# Patient Record
Sex: MALE | Race: WHITE | NOT HISPANIC OR LATINO | URBAN - METROPOLITAN AREA
[De-identification: names, ages, dates, MRNs, and addresses within clinical notes are randomized per-mention and may not be internally consistent; named-entity substitution may affect disease eponyms.]

---

## 2017-02-10 ENCOUNTER — OUTPATIENT (OUTPATIENT)
Dept: OUTPATIENT SERVICES | Facility: HOSPITAL | Age: 62
LOS: 1 days | End: 2017-02-10
Payer: COMMERCIAL

## 2017-02-10 DIAGNOSIS — R07.9 CHEST PAIN, UNSPECIFIED: ICD-10-CM

## 2017-02-10 PROCEDURE — 93018 CV STRESS TEST I&R ONLY: CPT

## 2017-02-10 PROCEDURE — 93350 STRESS TTE ONLY: CPT | Mod: 26

## 2017-02-10 PROCEDURE — 93351 STRESS TTE COMPLETE: CPT

## 2017-02-10 PROCEDURE — 93320 DOPPLER ECHO COMPLETE: CPT | Mod: 26

## 2017-02-10 PROCEDURE — 93325 DOPPLER ECHO COLOR FLOW MAPG: CPT | Mod: 26

## 2017-02-10 PROCEDURE — 93016 CV STRESS TEST SUPVJ ONLY: CPT

## 2020-01-03 ENCOUNTER — RECORD ABSTRACTING (OUTPATIENT)
Age: 65
End: 2020-01-03

## 2020-01-03 LAB
PSA FREE FLD-MCNC: 21.2
PSA FREE SERPL-MCNC: 1.17
PSA SERPL-MCNC: 5.53
TESTOST BND SERPL-MCNC: 376.7

## 2020-03-02 ENCOUNTER — APPOINTMENT (OUTPATIENT)
Dept: UROLOGY | Facility: CLINIC | Age: 65
End: 2020-03-02
Payer: MEDICARE

## 2020-03-02 VITALS
HEART RATE: 65 BPM | HEIGHT: 72 IN | BODY MASS INDEX: 29.12 KG/M2 | WEIGHT: 215 LBS | SYSTOLIC BLOOD PRESSURE: 129 MMHG | DIASTOLIC BLOOD PRESSURE: 83 MMHG | TEMPERATURE: 98.9 F

## 2020-03-02 DIAGNOSIS — Z87.891 PERSONAL HISTORY OF NICOTINE DEPENDENCE: ICD-10-CM

## 2020-03-02 DIAGNOSIS — M87.059 IDIOPATHIC ASEPTIC NECROSIS OF UNSPECIFIED FEMUR: ICD-10-CM

## 2020-03-02 DIAGNOSIS — Z78.9 OTHER SPECIFIED HEALTH STATUS: ICD-10-CM

## 2020-03-02 DIAGNOSIS — I10 ESSENTIAL (PRIMARY) HYPERTENSION: ICD-10-CM

## 2020-03-02 DIAGNOSIS — I25.10 ATHEROSCLEROTIC HEART DISEASE OF NATIVE CORONARY ARTERY W/OUT ANGINA PECTORIS: ICD-10-CM

## 2020-03-02 DIAGNOSIS — Z00.00 ENCOUNTER FOR GENERAL ADULT MEDICAL EXAMINATION W/OUT ABNORMAL FINDINGS: ICD-10-CM

## 2020-03-02 DIAGNOSIS — E78.5 HYPERLIPIDEMIA, UNSPECIFIED: ICD-10-CM

## 2020-03-02 DIAGNOSIS — Z80.1 FAMILY HISTORY OF MALIGNANT NEOPLASM OF TRACHEA, BRONCHUS AND LUNG: ICD-10-CM

## 2020-03-02 DIAGNOSIS — I25.2 OLD MYOCARDIAL INFARCTION: ICD-10-CM

## 2020-03-02 LAB
BILIRUB UR QL STRIP: NORMAL
CLARITY UR: CLEAR
COLLECTION METHOD: NORMAL
GLUCOSE UR-MCNC: NORMAL
HCG UR QL: 0.2 EU/DL
HGB UR QL STRIP.AUTO: NORMAL
KETONES UR-MCNC: NORMAL
LEUKOCYTE ESTERASE UR QL STRIP: NORMAL
NITRITE UR QL STRIP: NORMAL
PH UR STRIP: 7
PROT UR STRIP-MCNC: NORMAL
SP GR UR STRIP: 1.02

## 2020-03-02 PROCEDURE — 81003 URINALYSIS AUTO W/O SCOPE: CPT | Mod: QW

## 2020-03-02 PROCEDURE — 99204 OFFICE O/P NEW MOD 45 MIN: CPT

## 2020-03-02 PROCEDURE — 76857 US EXAM PELVIC LIMITED: CPT

## 2020-03-02 RX ORDER — ROSUVASTATIN CALCIUM 5 MG/1
TABLET, FILM COATED ORAL
Refills: 0 | Status: ACTIVE | COMMUNITY

## 2020-03-02 RX ORDER — LOSARTAN POTASSIUM 100 MG/1
TABLET, FILM COATED ORAL
Refills: 0 | Status: ACTIVE | COMMUNITY

## 2020-03-02 RX ORDER — SILDENAFIL 100 MG/1
100 TABLET, FILM COATED ORAL
Qty: 10 | Refills: 12 | Status: ACTIVE | COMMUNITY
Start: 2020-03-02 | End: 1900-01-01

## 2020-03-02 RX ORDER — PRASUGREL HYDROCHLORIDE 10 MG/1
TABLET, COATED ORAL
Refills: 0 | Status: ACTIVE | COMMUNITY

## 2020-03-02 RX ORDER — LOSARTAN POTASSIUM 100 MG/1
100 TABLET, FILM COATED ORAL
Refills: 0 | Status: ACTIVE | COMMUNITY

## 2020-03-02 RX ORDER — METOPROLOL TARTRATE 75 MG/1
TABLET, FILM COATED ORAL
Refills: 0 | Status: ACTIVE | COMMUNITY

## 2020-03-02 RX ORDER — ASPIRIN 81 MG
81 TABLET,CHEWABLE ORAL
Refills: 0 | Status: ACTIVE | COMMUNITY

## 2020-03-02 NOTE — PHYSICAL EXAM
[General Appearance - Well Developed] : well developed [General Appearance - Well Nourished] : well nourished [Normal Appearance] : normal appearance [Well Groomed] : well groomed [General Appearance - In No Acute Distress] : no acute distress [Heart Rate And Rhythm] : Heart rate and rhythm were normal [Edema] : no peripheral edema [Respiration, Rhythm And Depth] : normal respiratory rhythm and effort [Exaggerated Use Of Accessory Muscles For Inspiration] : no accessory muscle use [Abdomen Soft] : soft [Abdomen Tenderness] : non-tender [Abdomen Mass (___ Cm)] : no abdominal mass palpated [Costovertebral Angle Tenderness] : no ~M costovertebral angle tenderness [Urethral Meatus] : meatus normal [Urinary Bladder Findings] : the bladder was normal on palpation [Scrotum] : the scrotum was normal [Epididymis] : the epididymides were normal [Testes Tenderness] : no tenderness of the testes [No Prostate Nodules] : no prostate nodules [Testes Mass (___cm)] : there were no testicular masses [Prostate Tenderness] : the prostate was not tender [Normal Station and Gait] : the gait and station were normal for the patient's age [Skin Color & Pigmentation] : normal skin color and pigmentation [No Focal Deficits] : no focal deficits [] : no rash [Oriented To Time, Place, And Person] : oriented to person, place, and time [Affect] : the affect was normal [Mood] : the mood was normal [Not Anxious] : not anxious [No Palpable Adenopathy] : no palpable adenopathy [FreeTextEntry1] : Small, reducible umbilical hernia

## 2020-03-02 NOTE — HISTORY OF PRESENT ILLNESS
[FreeTextEntry1] : Mr. MIRELLA GUEVARA comes in today for a urologic evaluation.  He presents with minimal LUTS (obstructive and irritative) and nocturia x 2. \par IPSS: 10/35\par Sono:  12cc PVR; 86cc prostate  \par \par PSAs: 12/2/19--5.1 (25%); 2/6/19--5.53 (21%); 3/5/18--5.43 (23%); 11/21/16--4.94 (21%); 11/14--6.1; \par \par MRI prostate:  6/7/17--No suspicious areas. 61cc prostate.  5mm anterior bladder nodule--? lymph node. Bilat femoral head signal alteration suspicious for AVN. \par

## 2020-03-02 NOTE — LETTER BODY
[Consult Letter:] : I had the pleasure of evaluating your patient, [unfilled]. [Dear  ___] : Dear  [unfilled], [Please see my note below.] : Please see my note below. [Sincerely,] : Sincerely, [Consult Closing:] : Thank you very much for allowing me to participate in the care of this patient.  If you have any questions, please do not hesitate to contact me. [FreeTextEntry3] : Mayank Phillip MD, FACS\par

## 2020-03-02 NOTE — ASSESSMENT
[FreeTextEntry1] : I discussed the findings and options with Mr. MIRELLA GUEVARA in detail.  He will consider discontinuing the alfuzosin and restarting it if the voiding symptoms worsen.  Regarding the ED, Mr. Guevara will continue on the sildenafil and he will followup with us in one year (sono, PSA).\par

## 2020-03-02 NOTE — REVIEW OF SYSTEMS
[see HPI] : see HPI [Arthralgias] : arthralgias [Joint Pain] : joint pain [Easy Bleeding] : a tendency for easy bleeding [Easy Bruising] : a tendency for easy bruising [Negative] : Endocrine

## 2021-03-01 ENCOUNTER — APPOINTMENT (OUTPATIENT)
Dept: UROLOGY | Facility: CLINIC | Age: 66
End: 2021-03-01
Payer: MEDICARE

## 2021-03-01 VITALS — HEIGHT: 70 IN | BODY MASS INDEX: 24.2 KG/M2 | WEIGHT: 169 LBS

## 2021-03-01 DIAGNOSIS — Z80.42 FAMILY HISTORY OF MALIGNANT NEOPLASM OF PROSTATE: ICD-10-CM

## 2021-03-01 LAB
BILIRUB UR QL STRIP: NORMAL
CLARITY UR: CLEAR
COLLECTION METHOD: NORMAL
GLUCOSE UR-MCNC: NORMAL
HCG UR QL: 0.2 EU/DL
HGB UR QL STRIP.AUTO: NORMAL
KETONES UR-MCNC: NORMAL
LEUKOCYTE ESTERASE UR QL STRIP: NORMAL
NITRITE UR QL STRIP: NORMAL
PH UR STRIP: 6.5
PROT UR STRIP-MCNC: NORMAL
SP GR UR STRIP: 1.02

## 2021-03-01 PROCEDURE — 99215 OFFICE O/P EST HI 40 MIN: CPT

## 2021-03-01 PROCEDURE — 76857 US EXAM PELVIC LIMITED: CPT

## 2021-03-01 NOTE — HISTORY OF PRESENT ILLNESS
[FreeTextEntry1] : Mr. MIRELLA GUEVARA comes in today for his urologic follow-up.  He reports minimal lower urinary tract symptoms (obstructive and irritative) and nocturia x 2. He restarted the alfuzosin as he feels this is effective.\par IPSS: 11/35\par Sono:  115cc PVR; 105cc prostate\par \par Mr. Guevara has ED managed with sildenafil 100mg, with a variable response\par \par PSAs: 2/16/21--5.3; 12/2/19--5.1 (25%); 2/6/19--5.53 (21%); 1/3/19--5.53 (21%); 3/5/18--5.43 (23%); 11/21/16--4.94 (21%); 11/14--6.1; \par \par MRI prostate:  6/7/17--No suspicious areas. 61cc prostate.  5mm anterior bladder nodule--? lymph node. Bilat femoral head signal alteration suspicious for AVN*. \par \par * This has been followed up since and has remained stable.  No intervention is warranted.

## 2021-03-01 NOTE — ADDENDUM
[FreeTextEntry1] : A portion of this note was written by [Sandro Armas] on 02/23/2021 acting as a scribe for Dr. Phillip. \par \par I have personally reviewed the chart and agree that the record accurately reflects my personal performance of the history, physical exam, assessment, and plan.

## 2021-03-01 NOTE — LETTER BODY
[Dear  ___] : Dear  [unfilled], [Consult Letter:] : I had the pleasure of evaluating your patient, [unfilled]. [Please see my note below.] : Please see my note below. [Consult Closing:] : Thank you very much for allowing me to participate in the care of this patient.  If you have any questions, please do not hesitate to contact me. [Sincerely,] : Sincerely, [FreeTextEntry3] : Mayank Phillip MD, FACS\par

## 2021-03-01 NOTE — ASSESSMENT
[FreeTextEntry1] : I discussed the findings and options with Mr. MIRELLA GUEVARA in detail.  I reviewed behavioral modification with him.  He will continue with the alfuzosin as it seems to be effective.\par \par Regarding the erectile dysfunction, I reviewed how sildenafil should be taken and provided him with the appropriate prescription (100 mg, #20, R 12).\par \par I reviewed the June 2017 MRI with Mr. Guevara and advised that he consider a repeat at [facility].  Once he has this done I will call him with the result.\par \par Mr. Guevara is essentially retired and will be relocating to [place] with regular visits to [place].\par \par Providing there are no new problems, I look forward to seeing him in 1 year (bladder sono, PSA).  \par

## 2021-03-01 NOTE — PHYSICAL EXAM
[General Appearance - Well Developed] : well developed [General Appearance - Well Nourished] : well nourished [Normal Appearance] : normal appearance [Well Groomed] : well groomed [General Appearance - In No Acute Distress] : no acute distress [Abdomen Soft] : soft [Abdomen Tenderness] : non-tender [Abdomen Mass (___ Cm)] : no abdominal mass palpated [Costovertebral Angle Tenderness] : no ~M costovertebral angle tenderness [Urethral Meatus] : meatus normal [Urinary Bladder Findings] : the bladder was normal on palpation [Scrotum] : the scrotum was normal [Epididymis] : the epididymides were normal [Testes Tenderness] : no tenderness of the testes [Testes Mass (___cm)] : there were no testicular masses [Prostate Tenderness] : the prostate was not tender [No Prostate Nodules] : no prostate nodules [Skin Color & Pigmentation] : normal skin color and pigmentation [Heart Rate And Rhythm] : Heart rate and rhythm were normal [Edema] : no peripheral edema [] : no respiratory distress [Respiration, Rhythm And Depth] : normal respiratory rhythm and effort [Exaggerated Use Of Accessory Muscles For Inspiration] : no accessory muscle use [Oriented To Time, Place, And Person] : oriented to person, place, and time [Affect] : the affect was normal [Mood] : the mood was normal [Not Anxious] : not anxious [Normal Station and Gait] : the gait and station were normal for the patient's age [No Focal Deficits] : no focal deficits [No Palpable Adenopathy] : no palpable adenopathy [FreeTextEntry1] : Small, reducible umbilical hernia

## 2021-03-15 ENCOUNTER — NON-APPOINTMENT (OUTPATIENT)
Age: 66
End: 2021-03-15

## 2021-08-16 ENCOUNTER — TRANSCRIPTION ENCOUNTER (OUTPATIENT)
Age: 66
End: 2021-08-16

## 2022-07-11 ENCOUNTER — APPOINTMENT (OUTPATIENT)
Dept: UROLOGY | Facility: CLINIC | Age: 67
End: 2022-07-11

## 2022-07-11 VITALS
WEIGHT: 215 LBS | HEART RATE: 68 BPM | RESPIRATION RATE: 18 BRPM | DIASTOLIC BLOOD PRESSURE: 106 MMHG | TEMPERATURE: 98.2 F | HEIGHT: 70 IN | BODY MASS INDEX: 30.78 KG/M2 | SYSTOLIC BLOOD PRESSURE: 169 MMHG

## 2022-07-11 LAB
BILIRUB UR QL STRIP: NORMAL
CLARITY UR: CLEAR
COLLECTION METHOD: NORMAL
GLUCOSE UR-MCNC: NORMAL
HCG UR QL: 0.2 EU/DL
HGB UR QL STRIP.AUTO: NORMAL
KETONES UR-MCNC: NORMAL
LEUKOCYTE ESTERASE UR QL STRIP: NORMAL
NITRITE UR QL STRIP: NORMAL
PH UR STRIP: 7
PROT UR STRIP-MCNC: NORMAL
SP GR UR STRIP: 1.01

## 2022-07-11 PROCEDURE — 76857 US EXAM PELVIC LIMITED: CPT

## 2022-07-11 PROCEDURE — 81003 URINALYSIS AUTO W/O SCOPE: CPT | Mod: QW

## 2022-07-11 PROCEDURE — 99215 OFFICE O/P EST HI 40 MIN: CPT

## 2022-07-11 RX ORDER — METOPROLOL SUCCINATE 25 MG/1
25 TABLET, EXTENDED RELEASE ORAL
Qty: 90 | Refills: 0 | Status: ACTIVE | COMMUNITY
Start: 2022-01-17

## 2022-07-11 RX ORDER — CHLORTHALIDONE 25 MG/1
25 TABLET ORAL
Qty: 90 | Refills: 0 | Status: ACTIVE | COMMUNITY
Start: 2022-06-09

## 2022-07-11 RX ORDER — EVOLOCUMAB 140 MG/ML
140 INJECTION, SOLUTION SUBCUTANEOUS
Qty: 6 | Refills: 0 | Status: ACTIVE | COMMUNITY
Start: 2022-01-04

## 2022-07-11 RX ORDER — ALIROCUMAB 75 MG/ML
75 INJECTION, SOLUTION SUBCUTANEOUS
Qty: 2 | Refills: 0 | Status: ACTIVE | COMMUNITY
Start: 2022-01-17

## 2022-07-11 NOTE — LETTER BODY
[Dear  ___] : Dear  [unfilled], [Consult Letter:] : I had the pleasure of evaluating your patient, [unfilled]. [Please see my note below.] : Please see my note below. [Consult Closing:] : Thank you very much for allowing me to participate in the care of this patient.  If you have any questions, please do not hesitate to contact me. [Sincerely,] : Sincerely, [DrJesse  ___] : Dr. SANCHEZ [FreeTextEntry3] : Mayank Phillip MD, FACS\par

## 2022-07-11 NOTE — HISTORY OF PRESENT ILLNESS
[FreeTextEntry1] : Mr. MIRELLA GUEVARA comes in today for his annual urologic follow-up.  He reports moderate stable lower urinary tract symptoms (obstructive and irritative) and nocturia x 2. He is continuing on alfuzosin, which he feels is beneficial. \par IPSS: 11/35\par Sono (performed to assess bladder emptying): 58cc PVR, 121cc prostate\par \par Mr. Guevara has erectile dysfunction for which he takes sildenafil 100-150mg, with a satisfactory response. \par \par PSAs: 2/16/22--6.5 (23%); 2/16/21--5.3*; 2/3/21--7.5 (23); 12/2/19--5.1 (25%); 2/6/19--5.53 (21%); 1/3/19--5.53 (21%); 3/5/18--5.43 (23%); 11/21/16--4.94 (21%); 11/14--6.1; \par *PSAD: 0.72\par \par MRI prostate:  3/8/21--Subcentimeter right perivesical nodule remains indeterminate but is stable in size and of doubtful clinical significance. Prostate 73cc; 6/7/17--No suspicious areas. 61cc prostate.  5mm anterior bladder nodule--? lymph node. Bilat femoral head signal alteration suspicious for AVN*. \par \par * This has been followed up since and has remained stable.  No intervention is warranted.

## 2022-07-11 NOTE — PHYSICAL EXAM
[General Appearance - Well Developed] : well developed [General Appearance - Well Nourished] : well nourished [Normal Appearance] : normal appearance [Well Groomed] : well groomed [General Appearance - In No Acute Distress] : no acute distress [Abdomen Soft] : soft [Abdomen Tenderness] : non-tender [Abdomen Mass (___ Cm)] : no abdominal mass palpated [Costovertebral Angle Tenderness] : no ~M costovertebral angle tenderness [Urethral Meatus] : meatus normal [Urinary Bladder Findings] : the bladder was normal on palpation [Scrotum] : the scrotum was normal [Epididymis] : the epididymides were normal [Testes Tenderness] : no tenderness of the testes [Testes Mass (___cm)] : there were no testicular masses [Prostate Tenderness] : the prostate was not tender [No Prostate Nodules] : no prostate nodules [Skin Color & Pigmentation] : normal skin color and pigmentation [Heart Rate And Rhythm] : Heart rate and rhythm were normal [Edema] : no peripheral edema [] : no respiratory distress [Exaggerated Use Of Accessory Muscles For Inspiration] : no accessory muscle use [Respiration, Rhythm And Depth] : normal respiratory rhythm and effort [Oriented To Time, Place, And Person] : oriented to person, place, and time [Affect] : the affect was normal [Mood] : the mood was normal [Not Anxious] : not anxious [Normal Station and Gait] : the gait and station were normal for the patient's age [No Focal Deficits] : no focal deficits [No Palpable Adenopathy] : no palpable adenopathy [Penis Abnormality] : normal circumcised penis [FreeTextEntry1] : Small, reducible umbilical hernia

## 2022-07-11 NOTE — ASSESSMENT
[FreeTextEntry1] : I discussed the findings and options with Mr. MIRELLA GUEVARA in detail.  He is satisfied with his voiding pattern and will simply continue on the alfuzosin.\par \par Mr. Guevara will use either 100 or 150 mg of Viagra as needed for the ED, and I reviewed its mode of use.\par \par Providing the PSA remains stable and there are no new problems, I would like to see him in 1 year (bladder sono, PSA).

## 2022-07-11 NOTE — ADDENDUM
[FreeTextEntry1] : A portion of this note was written by [Sandro Armas] on 07/11/2022 acting as a scribe for Dr. Phillip. \par \par I have personally reviewed the chart and agree that the record accurately reflects my personal performance of the history, physical exam, assessment, and plan.

## 2022-07-12 LAB — PSA SERPL-MCNC: 9.58 NG/ML

## 2022-07-13 ENCOUNTER — TRANSCRIPTION ENCOUNTER (OUTPATIENT)
Age: 67
End: 2022-07-13

## 2022-07-14 ENCOUNTER — NON-APPOINTMENT (OUTPATIENT)
Age: 67
End: 2022-07-14

## 2022-10-25 ENCOUNTER — NON-APPOINTMENT (OUTPATIENT)
Age: 67
End: 2022-10-25

## 2023-02-06 ENCOUNTER — APPOINTMENT (OUTPATIENT)
Dept: UROLOGY | Facility: CLINIC | Age: 68
End: 2023-02-06
Payer: MEDICARE

## 2023-02-06 VITALS
OXYGEN SATURATION: 97 % | HEART RATE: 58 BPM | DIASTOLIC BLOOD PRESSURE: 89 MMHG | SYSTOLIC BLOOD PRESSURE: 162 MMHG | TEMPERATURE: 97.9 F

## 2023-02-06 PROCEDURE — 99214 OFFICE O/P EST MOD 30 MIN: CPT

## 2023-02-06 PROCEDURE — 51798 US URINE CAPACITY MEASURE: CPT

## 2023-02-06 RX ORDER — ALFUZOSIN HYDROCHLORIDE 10 MG/1
10 TABLET, EXTENDED RELEASE ORAL DAILY
Qty: 90 | Refills: 3 | Status: ACTIVE | COMMUNITY
Start: 2020-03-02 | End: 1900-01-01

## 2023-02-06 NOTE — PHYSICAL EXAM
[General Appearance - Well Developed] : well developed [General Appearance - Well Nourished] : well nourished [Normal Appearance] : normal appearance [Well Groomed] : well groomed [General Appearance - In No Acute Distress] : no acute distress [Abdomen Soft] : soft [Abdomen Tenderness] : non-tender [Abdomen Mass (___ Cm)] : no abdominal mass palpated [Costovertebral Angle Tenderness] : no ~M costovertebral angle tenderness [Urethral Meatus] : meatus normal [Penis Abnormality] : normal circumcised penis [Urinary Bladder Findings] : the bladder was normal on palpation [Scrotum] : the scrotum was normal [Epididymis] : the epididymides were normal [Testes Tenderness] : no tenderness of the testes [Testes Mass (___cm)] : there were no testicular masses [Prostate Tenderness] : the prostate was not tender [No Prostate Nodules] : no prostate nodules [Skin Color & Pigmentation] : normal skin color and pigmentation [Heart Rate And Rhythm] : Heart rate and rhythm were normal [Edema] : no peripheral edema [] : no respiratory distress [Respiration, Rhythm And Depth] : normal respiratory rhythm and effort [Exaggerated Use Of Accessory Muscles For Inspiration] : no accessory muscle use [Oriented To Time, Place, And Person] : oriented to person, place, and time [Affect] : the affect was normal [Mood] : the mood was normal [Not Anxious] : not anxious [Normal Station and Gait] : the gait and station were normal for the patient's age [No Focal Deficits] : no focal deficits [No Palpable Adenopathy] : no palpable adenopathy [FreeTextEntry1] : Small, reducible umbilical hernia

## 2023-02-06 NOTE — ASSESSMENT
[FreeTextEntry1] : I discussed the findings and options with Mr. MIRELLA GUEVARA in detail. He is doing well urologically and will simply continue on the alfuzosin for his stable urinary symptoms. \par \par Mr. Guevara will continue to use 100 mg of Viagra as needed for the ED, and try to decrease his dose to 50 mg.\par \par His recent PSAs have remained stable and should be repeated annually.\par \par Providing that there are no new problems, I look forward to seeing Mr. Guevara in one year (bladder sono, PSA).

## 2023-02-06 NOTE — ADDENDUM
[FreeTextEntry1] : A portion of this note was written by [Sandro Armas] on 02/03/2023 acting as a scribe for Dr. Phillip. \par \par I have personally reviewed the chart and agree that the record accurately reflects my personal performance of the history, physical exam, assessment, and plan.

## 2023-02-06 NOTE — HISTORY OF PRESENT ILLNESS
[FreeTextEntry1] : Mr. MIRELLA GUEVARA comes in today for his annual urologic follow-up. \par \par He reports moderate stable lower urinary tract symptoms (obstructive and irritative) and nocturia x 2. He is continuing on alfuzosin and is satisfied with this approach.  He drinks 2 caffeinated beverages daily and a shot of Vodka.\par IPSS: 8/35\par Sono (performed to assess bladder emptying): PVR 69cc\par \par Mr. Guevara has erectile dysfunction and continues to take sildenafil 100mg, with a satisfactory response. \par \par PSAs: 1/26/23--6.61; 8/15/22--7.6; 7/12/22--9.58; 2/16/22--6.5 (23%); 2/16/21--5.3*; 2/3/21--7.5 (23); 12/2/19--5.1 (25%); 2/6/19--5.53 (21%); 1/3/19--5.53 (21%); 3/5/18--5.43 (23%); 11/21/16--4.94 (21%); 11/14--6.1; \par *PSAD: 0.07\par \par MRI prostate:  3/8/21--Subcentimeter right perivesical nodule remains indeterminate but is stable in size and of doubtful clinical significance. Prostate 73cc; 6/7/17--No suspicious areas. 61cc prostate.  5mm anterior bladder nodule--? lymph node. Bilat femoral head signal alteration suspicious for AVN*. \par \par * This has been followed up since and has remained stable.  No intervention is warranted.

## 2023-10-24 ENCOUNTER — RX RENEWAL (OUTPATIENT)
Age: 68
End: 2023-10-24

## 2024-01-19 PROBLEM — N52.01 ERECTILE DYSFUNCTION DUE TO ARTERIAL INSUFFICIENCY: Status: ACTIVE | Noted: 2020-03-02

## 2024-01-19 PROBLEM — R39.9 LOWER URINARY TRACT SYMPTOMS: Status: ACTIVE | Noted: 2020-02-28

## 2024-01-19 PROBLEM — Z87.438 HISTORY OF BPH: Status: ACTIVE | Noted: 2020-03-02

## 2024-01-19 PROBLEM — R97.20 ELEVATED PSA: Status: ACTIVE | Noted: 2020-02-28

## 2024-01-19 PROBLEM — Z80.52 FAMILY HISTORY OF MALIGNANT NEOPLASM OF URINARY BLADDER: Status: ACTIVE | Noted: 2020-03-02

## 2024-01-19 PROBLEM — K42.9 UMBILICAL HERNIA: Status: ACTIVE | Noted: 2020-03-02

## 2024-01-22 ENCOUNTER — APPOINTMENT (OUTPATIENT)
Dept: UROLOGY | Facility: CLINIC | Age: 69
End: 2024-01-22
Payer: MEDICARE

## 2024-01-22 DIAGNOSIS — R97.20 ELEVATED PROSTATE, SPECIFIC ANTIGEN [PSA]: ICD-10-CM

## 2024-01-22 DIAGNOSIS — Z87.438 PERSONAL HISTORY OF OTHER DISEASES OF MALE GENITAL ORGANS: ICD-10-CM

## 2024-01-22 DIAGNOSIS — R39.9 UNSPECIFIED SYMPTOMS AND SIGNS INVOLVING THE GENITOURINARY SYSTEM: ICD-10-CM

## 2024-01-22 DIAGNOSIS — Z80.52 FAMILY HISTORY OF MALIGNANT NEOPLASM OF BLADDER: ICD-10-CM

## 2024-01-22 DIAGNOSIS — K42.9 UMBILICAL HERNIA W/OUT OBSTRUCTION OR GANGRENE: ICD-10-CM

## 2024-01-22 DIAGNOSIS — N52.01 ERECTILE DYSFUNCTION DUE TO ARTERIAL INSUFFICIENCY: ICD-10-CM

## 2024-01-22 PROCEDURE — 99214 OFFICE O/P EST MOD 30 MIN: CPT

## 2024-01-22 PROCEDURE — 51798 US URINE CAPACITY MEASURE: CPT

## 2024-01-22 RX ORDER — ALFUZOSIN HYDROCHLORIDE 10 MG/1
10 TABLET, EXTENDED RELEASE ORAL
Qty: 90 | Refills: 3 | Status: ACTIVE | COMMUNITY
Start: 2022-03-15 | End: 1900-01-01

## 2024-01-22 NOTE — HISTORY OF PRESENT ILLNESS
[FreeTextEntry1] : Mr. MIRELLA GUEVARA returns for his annual urologic follow-up. He reports moderate stable lower urinary tract symptoms (obstructive and irritative) and nocturia x 2. He is continuing on alfuzosin and is satisfied with this approach.  He drinks 2 caffeinated beverages daily and a shot of Vodka. IPSS: 10/3 Sono (performed to assess bladder emptying): 96cc PVR  Mr. Guevara has erectile dysfunction and continues to take sildenafil 100mg, with a somewhat variable  response.  PSAs: 11/15/23--6.65; 1/26/23--6.61; 8/15/22--7.6; 7/12/22--9.58; 2/16/22--6.5 (23%); 2/16/21--5.3*; 2/3/21--7.5 (23); 12/2/19--5.1 (25%); 2/6/19--5.53 (21%); 1/3/19--5.53 (21%); 3/5/18--5.43 (23%); 11/21/16--4.94 (21%); 11/14--6.1;  *PSAD: 0.07  MRI prostate:  3/8/21--Subcentimeter right perivesical nodule remains indeterminate but is stable in size and of doubtful clinical significance. Prostate 73cc; 6/7/17--No suspicious areas. 61cc prostate.  5mm anterior bladder nodule--? lymph node. Bilat femoral head signal alteration suspicious for AVN*.   * This has been followed up since and has remained stable.  No intervention is warranted.

## 2024-01-22 NOTE — ASSESSMENT
[FreeTextEntry1] : I discussed the findings and options with Mr. MIRELLA GUEVARA in detail. He is doing well urologically and will simply continue on the alfuzosin for his stable urinary symptoms.  Mr. Guevara will continue to use 100-150mg of Viagra as needed for the ED,   His recent PSAs have remained stable and should be repeated annually.  Providing that there are no new problems, I look forward to seeing Mr. Guevara in one year (bladder sono, PSA).

## 2024-09-06 ENCOUNTER — OFFICE (OUTPATIENT)
Dept: URBAN - METROPOLITAN AREA CLINIC 29 | Facility: CLINIC | Age: 69
Setting detail: OPHTHALMOLOGY
End: 2024-09-06
Payer: MEDICARE

## 2024-09-06 DIAGNOSIS — C43.9: ICD-10-CM

## 2024-09-06 DIAGNOSIS — H02.834: ICD-10-CM

## 2024-09-06 DIAGNOSIS — H01.004: ICD-10-CM

## 2024-09-06 DIAGNOSIS — H25.13: ICD-10-CM

## 2024-09-06 DIAGNOSIS — H02.831: ICD-10-CM

## 2024-09-06 DIAGNOSIS — H01.001: ICD-10-CM

## 2024-09-06 DIAGNOSIS — H16.223: ICD-10-CM

## 2024-09-06 PROCEDURE — 92004 COMPRE OPH EXAM NEW PT 1/>: CPT | Performed by: OPHTHALMOLOGY

## 2024-09-06 ASSESSMENT — LID EXAM ASSESSMENTS
OS_BLEPHARITIS: LUL 1+
OD_BLEPHARITIS: RUL 1+

## 2024-09-06 ASSESSMENT — LID POSITION - DERMATOCHALASIS
OS_DERMATOCHALASIS: LUL T
OD_DERMATOCHALASIS: RUL T

## 2024-09-06 ASSESSMENT — CONFRONTATIONAL VISUAL FIELD TEST (CVF)
OD_FINDINGS: FULL
OS_FINDINGS: FULL

## 2024-11-20 ENCOUNTER — NON-APPOINTMENT (OUTPATIENT)
Age: 69
End: 2024-11-20

## 2024-11-27 ASSESSMENT — REFRACTION_CURRENTRX
OS_CYLINDER: SPH
OD_AXIS: 31
OD_OVR_VA: 20/
OD_CYLINDER: +1.50
OD_SPHERE: -3.50
OS_SPHERE: -2.00
OS_OVR_VA: 20/

## 2025-01-10 ENCOUNTER — APPOINTMENT (OUTPATIENT)
Dept: UROLOGY | Facility: CLINIC | Age: 70
End: 2025-01-10
Payer: MEDICARE

## 2025-01-10 VITALS
DIASTOLIC BLOOD PRESSURE: 71 MMHG | OXYGEN SATURATION: 99 % | HEART RATE: 62 BPM | SYSTOLIC BLOOD PRESSURE: 126 MMHG | TEMPERATURE: 98.2 F

## 2025-01-10 DIAGNOSIS — Z87.438 PERSONAL HISTORY OF OTHER DISEASES OF MALE GENITAL ORGANS: ICD-10-CM

## 2025-01-10 DIAGNOSIS — R39.9 UNSPECIFIED SYMPTOMS AND SIGNS INVOLVING THE GENITOURINARY SYSTEM: ICD-10-CM

## 2025-01-10 LAB — PSA PROFILE - TOTAL AND FREE: 6.09

## 2025-01-10 PROCEDURE — 51741 ELECTRO-UROFLOWMETRY FIRST: CPT

## 2025-01-10 PROCEDURE — 51798 US URINE CAPACITY MEASURE: CPT

## 2025-01-10 PROCEDURE — G2211 COMPLEX E/M VISIT ADD ON: CPT

## 2025-01-10 PROCEDURE — 99214 OFFICE O/P EST MOD 30 MIN: CPT

## 2025-01-11 ENCOUNTER — NON-APPOINTMENT (OUTPATIENT)
Age: 70
End: 2025-01-11

## 2025-01-11 LAB
APPEARANCE: CLEAR
BILIRUBIN URINE: NEGATIVE
BLOOD URINE: NEGATIVE
COLOR: YELLOW
GLUCOSE QUALITATIVE U: NEGATIVE MG/DL
KETONES URINE: NEGATIVE MG/DL
LEUKOCYTE ESTERASE URINE: NEGATIVE
MICROSCOPIC-UA: NORMAL
NITRITE URINE: NEGATIVE
PH URINE: 7.5
PROLACTIN SERPL-MCNC: 8 NG/ML
PROTEIN URINE: NEGATIVE MG/DL
PSA FREE FLD-MCNC: 21 %
PSA FREE SERPL-MCNC: 3.01 NG/ML
PSA SERPL-MCNC: 14.2 NG/ML
RED BLOOD CELLS URINE: 0 /HPF
SPECIFIC GRAVITY URINE: 1.01
TESTOST SERPL-MCNC: 500 NG/DL
UROBILINOGEN URINE: 0.2 MG/DL
WHITE BLOOD CELLS URINE: 1 /HPF

## 2025-01-13 ENCOUNTER — TRANSCRIPTION ENCOUNTER (OUTPATIENT)
Age: 70
End: 2025-01-13

## 2025-01-13 DIAGNOSIS — R97.20 ELEVATED PROSTATE, SPECIFIC ANTIGEN [PSA]: ICD-10-CM

## 2025-01-23 ENCOUNTER — TRANSCRIPTION ENCOUNTER (OUTPATIENT)
Age: 70
End: 2025-01-23

## 2025-01-24 ENCOUNTER — TRANSCRIPTION ENCOUNTER (OUTPATIENT)
Age: 70
End: 2025-01-24

## 2025-01-29 ENCOUNTER — TRANSCRIPTION ENCOUNTER (OUTPATIENT)
Age: 70
End: 2025-01-29

## 2025-03-07 ENCOUNTER — TRANSCRIPTION ENCOUNTER (OUTPATIENT)
Age: 70
End: 2025-03-07